# Patient Record
Sex: MALE | Race: WHITE | Employment: FULL TIME | URBAN - METROPOLITAN AREA
[De-identification: names, ages, dates, MRNs, and addresses within clinical notes are randomized per-mention and may not be internally consistent; named-entity substitution may affect disease eponyms.]

---

## 2022-07-12 ENCOUNTER — PSORIASIS (OUTPATIENT)
Dept: URBAN - METROPOLITAN AREA CLINIC 32 | Facility: CLINIC | Age: 42
Setting detail: DERMATOLOGY
End: 2022-07-12

## 2022-07-12 ENCOUNTER — RX ONLY (RX ONLY)
Age: 42
End: 2022-07-12

## 2022-07-12 DIAGNOSIS — L40.0 PSORIASIS VULGARIS: ICD-10-CM

## 2022-07-12 PROCEDURE — 99204 OFFICE O/P NEW MOD 45 MIN: CPT

## 2022-07-12 RX ORDER — CALCIPOTRIENE 0.05 MG/ML
1 A SMALL AMOUNT SOLUTION TOPICAL AS DIRECTED
Qty: 60 | Refills: 6
Start: 2022-07-12

## 2022-07-12 RX ORDER — FLUTICASONE PROPIONATE 0.05 MG/G
1 A SMALL AMOUNT OINTMENT TOPICAL TWICE A DAY
Qty: 15 | Refills: 0
Start: 2022-07-12

## 2022-07-12 RX ORDER — CLOBETASOL PROPIONATE 0.5 MG/ML
1 A SMALL AMOUNT SOLUTION TOPICAL TWICE A DAY
Qty: 50 | Refills: 1
Start: 2022-07-12

## 2022-08-22 ENCOUNTER — APPOINTMENT (OUTPATIENT)
Dept: URBAN - METROPOLITAN AREA CLINIC 208 | Age: 42
Setting detail: DERMATOLOGY
End: 2022-08-22

## 2022-08-22 DIAGNOSIS — L21.8 OTHER SEBORRHEIC DERMATITIS: ICD-10-CM

## 2022-08-22 DIAGNOSIS — L40.0 PSORIASIS VULGARIS: ICD-10-CM

## 2022-08-22 PROCEDURE — 99214 OFFICE O/P EST MOD 30 MIN: CPT

## 2022-08-22 PROCEDURE — OTHER PRESCRIPTION MEDICATION MANAGEMENT: OTHER

## 2022-08-22 PROCEDURE — OTHER COUNSELING: OTHER

## 2022-08-22 ASSESSMENT — LOCATION DETAILED DESCRIPTION DERM
LOCATION DETAILED: RIGHT CENTRAL EYEBROW
LOCATION DETAILED: RIGHT POSTERIOR EAR
LOCATION DETAILED: LEFT SUPERIOR PARIETAL SCALP
LOCATION DETAILED: LEFT CENTRAL EYEBROW
LOCATION DETAILED: LEFT INFERIOR POSTERIOR HELIX

## 2022-08-22 ASSESSMENT — LOCATION SIMPLE DESCRIPTION DERM
LOCATION SIMPLE: RIGHT EAR
LOCATION SIMPLE: LEFT EYEBROW
LOCATION SIMPLE: RIGHT EYEBROW
LOCATION SIMPLE: LEFT EAR
LOCATION SIMPLE: SCALP

## 2022-08-22 ASSESSMENT — LOCATION ZONE DERM
LOCATION ZONE: FACE
LOCATION ZONE: SCALP
LOCATION ZONE: EAR

## 2022-08-22 NOTE — HPI: RASH (SEBORRHEIC DERMATITIS)
How Severe Is It?: moderate
Is This A New Presentation, Or A Follow-Up?: Follow Up Seborrheic Dermatitis
Additional History: Psoriasis and seb derm. Mild joint pain.  Completed the fluticasone ointment bid x 2 weeks which helped clear his eyelids. Has reduced the calcipotriene and Clobetasol solutions because he was better, but now has flares again so has restarted. Wants to make sure he’s doing it right

## 2022-08-22 NOTE — PROCEDURE: PRESCRIPTION MEDICATION MANAGEMENT
Render In Strict Bullet Format?: No
Detail Level: Zone
Plan: continue: \\nfluticasone oint BID x 7-10 days with flares on eyelids\\nclobetasol soln BIW or QOD to scalp\\ncalcipotriene soln to scalp, eyelids BID prn

## 2022-08-22 NOTE — PROCEDURE: COUNSELING
Patient Specific Counseling (Will Not Stick From Patient To Patient): Limit Clobetasol to once daily no longer than 4 weeks in a row. Can also just reduce to a few times per week as needed for maintenance \\nLimit the fluticasone ointment on the facials skin to no more than twice daily up to 2 weeks\\nUse the calcipotriene as often as needed for maintenance.
Detail Level: Simple

## 2023-01-06 ENCOUNTER — RX ONLY (RX ONLY)
Age: 43
End: 2023-01-06

## 2023-01-06 RX ORDER — FLUTICASONE PROPIONATE 0.05 MG/G
OINTMENT TOPICAL
Qty: 15 | Refills: 6 | Status: ERX | COMMUNITY
Start: 2023-01-06

## 2023-01-06 RX ORDER — CLOBETASOL PROPIONATE 0.5 MG/ML
SOLUTION TOPICAL
Qty: 50 | Refills: 6 | Status: ERX | COMMUNITY
Start: 2023-01-06

## 2023-01-10 ENCOUNTER — APPOINTMENT (OUTPATIENT)
Dept: URBAN - METROPOLITAN AREA CLINIC 208 | Age: 43
Setting detail: DERMATOLOGY
End: 2023-01-19

## 2023-01-10 DIAGNOSIS — L40.0 PSORIASIS VULGARIS: ICD-10-CM

## 2023-01-10 PROCEDURE — OTHER COUNSELING: OTHER

## 2023-01-10 PROCEDURE — OTHER MIPS QUALITY: OTHER

## 2023-01-10 PROCEDURE — OTHER PRESCRIPTION: OTHER

## 2023-01-10 PROCEDURE — OTHER ADDITIONAL NOTES: OTHER

## 2023-01-10 PROCEDURE — OTHER PATIENT SPECIFIC COUNSELING: OTHER

## 2023-01-10 PROCEDURE — 99214 OFFICE O/P EST MOD 30 MIN: CPT

## 2023-01-10 PROCEDURE — OTHER PRESCRIPTION MEDICATION MANAGEMENT: OTHER

## 2023-01-10 RX ORDER — CALCIPOTRIENE 50 UG/G
OINTMENT TOPICAL
Qty: 60 | Refills: 5 | Status: ERX | COMMUNITY
Start: 2023-01-10

## 2023-01-10 RX ORDER — CLOBETASOL PROPIONATE 0.5 MG/G
OINTMENT TOPICAL
Qty: 45 | Refills: 6 | Status: ERX | COMMUNITY
Start: 2023-01-10

## 2023-01-10 ASSESSMENT — LOCATION DETAILED DESCRIPTION DERM
LOCATION DETAILED: LEFT DISTAL POSTERIOR THIGH
LOCATION DETAILED: MID-OCCIPITAL SCALP
LOCATION DETAILED: PERIUMBILICAL SKIN
LOCATION DETAILED: RIGHT KNEE
LOCATION DETAILED: RIGHT DISTAL POSTERIOR THIGH
LOCATION DETAILED: LEFT MID-UPPER BACK
LOCATION DETAILED: RIGHT ELBOW
LOCATION DETAILED: RIGHT SUPERIOR LATERAL MIDBACK
LOCATION DETAILED: RIGHT PROXIMAL CALF
LOCATION DETAILED: LEFT DISTAL CALF
LOCATION DETAILED: RIGHT PROXIMAL PRETIBIAL REGION
LOCATION DETAILED: INFERIOR MID FOREHEAD
LOCATION DETAILED: LEFT ELBOW
LOCATION DETAILED: LEFT KNEE

## 2023-01-10 ASSESSMENT — LOCATION SIMPLE DESCRIPTION DERM
LOCATION SIMPLE: POSTERIOR SCALP
LOCATION SIMPLE: RIGHT LOWER BACK
LOCATION SIMPLE: LEFT POSTERIOR THIGH
LOCATION SIMPLE: RIGHT PRETIBIAL REGION
LOCATION SIMPLE: RIGHT CALF
LOCATION SIMPLE: RIGHT ELBOW
LOCATION SIMPLE: ABDOMEN
LOCATION SIMPLE: LEFT KNEE
LOCATION SIMPLE: INFERIOR FOREHEAD
LOCATION SIMPLE: RIGHT KNEE
LOCATION SIMPLE: LEFT CALF
LOCATION SIMPLE: RIGHT POSTERIOR THIGH
LOCATION SIMPLE: LEFT UPPER BACK
LOCATION SIMPLE: LEFT ELBOW

## 2023-01-10 ASSESSMENT — LOCATION ZONE DERM
LOCATION ZONE: TRUNK
LOCATION ZONE: LEG
LOCATION ZONE: FACE
LOCATION ZONE: SCALP
LOCATION ZONE: ARM

## 2023-01-10 ASSESSMENT — BSA PSORIASIS: % BODY COVERED IN PSORIASIS: 5

## 2023-01-10 NOTE — PROCEDURE: PRESCRIPTION MEDICATION MANAGEMENT
Continue Regimen: Applying Calcipotriene ointment and Clobetasol ointment together to affected areas of body twice daily.\\nDiscontinue Clobetasol after 2-3 weeks. Continue Calcipotriene
Render In Strict Bullet Format?: No
Detail Level: Zone

## 2023-01-10 NOTE — PROCEDURE: ADDITIONAL NOTES
Render Risk Assessment In Note?: no
Additional Notes: Pt complains of joint pain in hands. Will monitor and consider biologics if joint pain worsens.\\nPt asked about anti-inflammatory diet - discussed that it's not harmful to try, but may or may not improve symptoms
Detail Level: Generalized

## 2023-01-10 NOTE — HPI: RASH (PSORIASIS)
How Severe Is Your Psoriasis?: moderate
Is This A New Presentation, Or A Follow-Up?: Follow Up Psoriasis
Additional History: TX’d new plaques with fluticasone ointment.

## 2023-02-13 ENCOUNTER — RX ONLY (RX ONLY)
Age: 43
End: 2023-02-13

## 2023-02-13 RX ORDER — CLOBETASOL PROPIONATE 0.5 MG/G
OINTMENT TOPICAL
Qty: 45 | Refills: 6 | Status: ERX

## 2023-03-10 ENCOUNTER — APPOINTMENT (OUTPATIENT)
Dept: URBAN - METROPOLITAN AREA CLINIC 208 | Age: 43
Setting detail: DERMATOLOGY
End: 2023-03-14

## 2023-03-10 DIAGNOSIS — L40.0 PSORIASIS VULGARIS: ICD-10-CM

## 2023-03-10 PROCEDURE — 99214 OFFICE O/P EST MOD 30 MIN: CPT

## 2023-03-10 PROCEDURE — OTHER PRESCRIPTION MEDICATION MANAGEMENT: OTHER

## 2023-03-10 PROCEDURE — OTHER COUNSELING: OTHER

## 2023-03-10 PROCEDURE — OTHER MIPS QUALITY: OTHER

## 2023-03-10 ASSESSMENT — BSA PSORIASIS: % BODY COVERED IN PSORIASIS: 10

## 2023-03-10 NOTE — PROCEDURE: PRESCRIPTION MEDICATION MANAGEMENT
Detail Level: Zone
Samples Given: Vtama and Zoryve samples provided - apply to affected areas once daily. Recommended that patient apply the samples to specific areas and document his response to the topical treatment. Per Dr. Vasquez, he can contact the office for a prescription of the topical medication he had a positive response to.
Render In Strict Bullet Format?: No

## 2023-03-30 ENCOUNTER — RX ONLY (RX ONLY)
Age: 43
End: 2023-03-30

## 2023-03-30 RX ORDER — CLOBETASOL PROPIONATE 0.5 MG/ML
SOLUTION TOPICAL
Qty: 50 | Refills: 6 | Status: ERX

## 2023-04-24 ENCOUNTER — APPOINTMENT (OUTPATIENT)
Dept: URBAN - METROPOLITAN AREA CLINIC 207 | Age: 43
Setting detail: DERMATOLOGY
End: 2023-04-24

## 2023-06-06 ENCOUNTER — RX ONLY (RX ONLY)
Age: 43
End: 2023-06-06

## 2023-06-06 RX ORDER — RISANKIZUMAB-RZAA 150 MG/ML
INJECTION SUBCUTANEOUS
Qty: 1 | Refills: 1 | Status: ERX | COMMUNITY
Start: 2023-06-06

## 2023-06-06 RX ORDER — RISANKIZUMAB-RZAA 150 MG/ML
INJECTION SUBCUTANEOUS
Qty: 1 | Refills: 0 | Status: ERX

## 2023-08-04 ENCOUNTER — RX ONLY (RX ONLY)
Age: 43
End: 2023-08-04

## 2023-08-04 RX ORDER — RISANKIZUMAB-RZAA 150 MG/ML
INJECTION SUBCUTANEOUS
Qty: 1 | Refills: 1 | Status: ERX

## 2024-02-26 ENCOUNTER — RX ONLY (RX ONLY)
Age: 44
End: 2024-02-26

## 2024-02-26 RX ORDER — RISANKIZUMAB-RZAA 150 MG/ML
INJECTION SUBCUTANEOUS
Qty: 1 | Refills: 0 | Status: ERX

## 2024-06-25 ENCOUNTER — APPOINTMENT (OUTPATIENT)
Dept: URBAN - METROPOLITAN AREA CLINIC 208 | Age: 44
Setting detail: DERMATOLOGY
End: 2024-06-25

## 2024-06-25 DIAGNOSIS — L40.0 PSORIASIS VULGARIS: ICD-10-CM

## 2024-06-25 PROCEDURE — OTHER MIPS QUALITY: OTHER

## 2024-06-25 PROCEDURE — OTHER SKYRIZI MONITORING: OTHER

## 2024-06-25 PROCEDURE — 99213 OFFICE O/P EST LOW 20 MIN: CPT

## 2024-06-25 PROCEDURE — OTHER COUNSELING: OTHER

## 2024-06-25 PROCEDURE — OTHER OTHER: OTHER

## 2024-06-25 PROCEDURE — OTHER ADDITIONAL NOTES: OTHER

## 2024-06-25 ASSESSMENT — LOCATION SIMPLE DESCRIPTION DERM
LOCATION SIMPLE: RIGHT FOREARM
LOCATION SIMPLE: RIGHT POSTERIOR THIGH
LOCATION SIMPLE: LEFT FOREARM
LOCATION SIMPLE: POSTERIOR SCALP
LOCATION SIMPLE: LEFT POSTERIOR THIGH
LOCATION SIMPLE: SCALP

## 2024-06-25 ASSESSMENT — LOCATION DETAILED DESCRIPTION DERM
LOCATION DETAILED: LEFT VENTRAL PROXIMAL FOREARM
LOCATION DETAILED: RIGHT DISTAL POSTERIOR THIGH
LOCATION DETAILED: LEFT DISTAL POSTERIOR THIGH
LOCATION DETAILED: RIGHT SUPERIOR PARIETAL SCALP
LOCATION DETAILED: MID-OCCIPITAL SCALP
LOCATION DETAILED: RIGHT VENTRAL PROXIMAL FOREARM

## 2024-06-25 ASSESSMENT — LOCATION ZONE DERM
LOCATION ZONE: SCALP
LOCATION ZONE: ARM
LOCATION ZONE: LEG

## 2024-06-25 ASSESSMENT — ITCH NUMERIC RATING SCALE: HOW SEVERE IS YOUR ITCHING?: 2

## 2024-06-25 ASSESSMENT — PGA PSORIASIS: PGA PSORIASIS 2020: CLEAR

## 2024-06-25 ASSESSMENT — BSA PSORIASIS: % BODY COVERED IN PSORIASIS: 1

## 2024-06-25 NOTE — HPI: RASH (PSORIASIS)
Do You Have A Family History Of Psoriasis?: no
How Severe Is Your Psoriasis?: moderate
Is This A New Presentation, Or A Follow-Up?: Follow Up Psoriasis
Additional History: Pt has been on Skyrizi for about a year now. He states that he is well-controlled with the Skyrizi but is currently flared on his scalp due to running out of his medication. Pt is unsure of when his last injection was. Per pt, his itch scale while on the medication is a 1-2 whereas it was an 8-9 before starting Skyrizi. Pt is due for his yearly TB test and his weight has maintained at about 180lbs.

## 2024-06-25 NOTE — PROCEDURE: MIPS QUALITY
Quality 226: Preventive Care And Screening: Tobacco Use: Screening And Cessation Intervention: Patient screened for tobacco use and is an ex/non-smoker
Quality 410: Psoriasis Clinical Response To Oral Systemic Or Biologic Medications: Psoriasis Assessment Tool Documented, Met Specified Benchmark
Detail Level: Detailed

## 2024-06-25 NOTE — PROCEDURE: ADDITIONAL NOTES
Detail Level: Simple
Additional Notes: Pt getting bloodwork done at his PCP soon. We need a TB and liver function test.\\nPt recently changed insurance, medication now costing $5,000 per injection.
Render Risk Assessment In Note?: no

## 2024-06-25 NOTE — PROCEDURE: OTHER
Render Risk Assessment In Note?: no
Note Text (......Xxx Chief Complaint.): This diagnosis correlates with the
Detail Level: Zone
Other (Free Text): Baseline BSA: 5%\\nCurrent BSA: <1%\\nPGA: 0\\nweight: 180lbs\\nDate of Last TB Test: pending\\nPsoriatic Arthritis: N/A\\nPrevious Biologic Use: N/A\\n\\n\\n* The above mentioned Overall Assess

## 2024-06-25 NOTE — PROCEDURE: SKYRIZI MONITORING
Add High Risk Medication Management Associated Diagnosis?: Yes
Comments: Weight: 180lbs\\nTB Test: Pending
Detail Level: Zone